# Patient Record
Sex: FEMALE | Employment: FULL TIME | URBAN - METROPOLITAN AREA
[De-identification: names, ages, dates, MRNs, and addresses within clinical notes are randomized per-mention and may not be internally consistent; named-entity substitution may affect disease eponyms.]

---

## 2018-04-24 ENCOUNTER — OFFICE VISIT (OUTPATIENT)
Dept: FAMILY MEDICINE | Facility: CLINIC | Age: 23
End: 2018-04-24
Payer: MEDICAID

## 2018-04-24 VITALS
DIASTOLIC BLOOD PRESSURE: 82 MMHG | HEART RATE: 88 BPM | TEMPERATURE: 99 F | BODY MASS INDEX: 38.37 KG/M2 | SYSTOLIC BLOOD PRESSURE: 133 MMHG | WEIGHT: 244.5 LBS | HEIGHT: 67 IN

## 2018-04-24 DIAGNOSIS — Z01.419 ENCOUNTER FOR WELL WOMAN EXAM: Primary | ICD-10-CM

## 2018-04-24 PROCEDURE — 99203 OFFICE O/P NEW LOW 30 MIN: CPT | Mod: PBBFAC,PO | Performed by: NURSE PRACTITIONER

## 2018-04-24 PROCEDURE — 99999 PR PBB SHADOW E&M-NEW PATIENT-LVL III: CPT | Mod: PBBFAC,,, | Performed by: NURSE PRACTITIONER

## 2018-04-24 PROCEDURE — 99385 PREV VISIT NEW AGE 18-39: CPT | Mod: S$PBB,,, | Performed by: NURSE PRACTITIONER

## 2018-04-24 PROCEDURE — 88175 CYTOPATH C/V AUTO FLUID REDO: CPT

## 2018-04-24 RX ORDER — ALBUTEROL SULFATE 90 UG/1
AEROSOL, METERED RESPIRATORY (INHALATION)
COMMUNITY
Start: 2018-04-23

## 2018-04-24 RX ORDER — LEVOCETIRIZINE DIHYDROCHLORIDE 5 MG/1
1 TABLET, FILM COATED ORAL DAILY
COMMUNITY
Start: 2018-04-23

## 2018-04-24 RX ORDER — FLUTICASONE FUROATE AND VILANTEROL 200; 25 UG/1; UG/1
1 POWDER RESPIRATORY (INHALATION) DAILY
COMMUNITY

## 2018-04-24 NOTE — PROGRESS NOTES
Chief Complaint   Patient presents with    Gynecologic Exam       History of Present Illness: Megha Kim is a 23 y.o. female that presents today 4/24/2018 for well gyn visit.  Pt presents today for well woman exam. Pt reports that this is her first well woman exam. She states that her cycles are once monthly with no associated complaints. She is not on birth control and does desire to be on anything. She is not sexually active. No other complaints or concerns noted.        Past Medical History:   Diagnosis Date    Allergy     Asthma        History reviewed. No pertinent surgical history.    Family History   Problem Relation Age of Onset    Asthma Mother     Asthma Father     Cancer Maternal Grandmother     Hypertension Maternal Grandmother     Diabetes Maternal Grandmother        Social History     Social History    Marital status: Single     Spouse name: N/A    Number of children: N/A    Years of education: N/A     Social History Main Topics    Smoking status: Never Smoker    Smokeless tobacco: Never Used    Alcohol use No    Drug use: No    Sexual activity: Not Currently     Other Topics Concern    None     Social History Narrative    None       OB History   No data available       Current Outpatient Prescriptions   Medication Sig Dispense Refill    fluticasone-vilanterol (BREO) 200-25 mcg/dose DsDv diskus inhaler Inhale 1 puff into the lungs once daily. Controller      levocetirizine (XYZAL) 5 MG tablet Take 1 tablet by mouth once daily.      VENTOLIN HFA 90 mcg/actuation inhaler        No current facility-administered medications for this visit.        Review of patient's allergies indicates:  No Known Allergies    Review of Symptoms:  GENERAL: Denies weight gain or weight loss. Feeling well overall.   SKIN: Denies rash or lesions.   HEAD: Denies head injury or headache.   ABDOMEN: No abdominal pain, constipation, diarrhea, nausea, vomiting or rectal bleeding.   URINARY: No frequency,  "dysuria, hematuria, or burning on urination.    /82 (BP Location: Right arm, Patient Position: Sitting, BP Method: Medium (Automatic))   Pulse 88   Temp 98.9 °F (37.2 °C) (Oral)   Ht 5' 7" (1.702 m)   Wt 110.9 kg (244 lb 7.8 oz)   LMP 04/21/2018 (Exact Date)     Physical Exam:  APPEARANCE: Well nourished, well developed, in no acute distress.  SKIN: Normal skin turgor, no lesions.  RESPIRATORY: Normal respiratory effort with no retractions or use of accessory muscles  ABDOMEN: Soft. No tenderness or masses. No hepatosplenomegaly. No hernias.  BREASTS: Symmetrical, no skin changes or visible lesions. No palpable masses, nipple discharge or adenopathy bilaterally.  PELVIC: Normal external female genitalia without lesions. Normal hair distribution. Adequate perineal body, normal urethral meatus. Urethra with no masses.  Bladder nontender. Vagina moist and well rugated without lesions or discharge. Cervix pink and without lesions. No significant cystocele or rectocele. Bimanual exam showed uterus normal size, shape, position, mobile and nontender. Adnexa without masses or tenderness. Urethra and bladder normal. PAP DONE      ASSESSMENT/PLAN:  Encounter for well woman exam  -     Liquid-based pap smear, screening          Patient was counseled today on Pap guidelines, recommendation for pelvic exams, mammograms starting annually at age 40, Colonoscopy after the age of 50, Dexa Bone Scan and calcium and vitamin D supplementation in menopause and to see her PCP for other health maintenance.       Follow-up:  RTC in 1 year for well woman exam or as needed  "

## 2018-05-02 ENCOUNTER — TELEPHONE (OUTPATIENT)
Dept: FAMILY MEDICINE | Facility: CLINIC | Age: 23
End: 2018-05-02

## 2018-05-02 NOTE — TELEPHONE ENCOUNTER
Patient informed of results and recommendations as noted by RAS Fisher NP. Patient verbalized understanding.    Pharmacy updated.

## 2018-05-02 NOTE — TELEPHONE ENCOUNTER
Please call and inform pt that her pap smears was normal, but it showed an overgrowth of yeast. I will send in diflucan, but there is no pharmacy in.    Thanks

## 2018-05-03 RX ORDER — FLUCONAZOLE 150 MG/1
150 TABLET ORAL DAILY
Qty: 1 TABLET | Refills: 1 | Status: SHIPPED | OUTPATIENT
Start: 2018-05-03 | End: 2018-06-02

## 2018-05-11 ENCOUNTER — TELEPHONE (OUTPATIENT)
Dept: FAMILY MEDICINE | Facility: CLINIC | Age: 23
End: 2018-05-11

## 2018-05-11 NOTE — TELEPHONE ENCOUNTER
----- Message from Yumiko Lubin sent at 5/11/2018  8:55 AM CDT -----  Type: Needs Medical Advice    Who Called: patient  Symptoms (please be specific):  Na  How long has patient had these symptoms: Jayda  Pharmacy name and phone #:  [facility]  Best Call Back Number: [phone] (home)     Additional Information: Wanted to discuss more information about completing a form